# Patient Record
Sex: MALE | Race: WHITE
[De-identification: names, ages, dates, MRNs, and addresses within clinical notes are randomized per-mention and may not be internally consistent; named-entity substitution may affect disease eponyms.]

---

## 2018-04-05 ENCOUNTER — HOSPITAL ENCOUNTER (OUTPATIENT)
Dept: HOSPITAL 89 - AUD | Age: 38
End: 2018-04-05
Attending: NURSE PRACTITIONER

## 2018-04-05 DIAGNOSIS — Z01.10: Primary | ICD-10-CM

## 2018-04-05 PROCEDURE — 92552 PURE TONE AUDIOMETRY AIR: CPT

## 2018-04-18 ENCOUNTER — HOSPITAL ENCOUNTER (EMERGENCY)
Dept: HOSPITAL 89 - ER | Age: 38
Discharge: HOME | End: 2018-04-18
Payer: COMMERCIAL

## 2018-04-18 VITALS — SYSTOLIC BLOOD PRESSURE: 143 MMHG | DIASTOLIC BLOOD PRESSURE: 103 MMHG
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
## 2018-04-18 DIAGNOSIS — S39.012A: Primary | ICD-10-CM

## 2018-04-18 PROCEDURE — 99282 EMERGENCY DEPT VISIT SF MDM: CPT
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
